# Patient Record
Sex: MALE | Race: WHITE
[De-identification: names, ages, dates, MRNs, and addresses within clinical notes are randomized per-mention and may not be internally consistent; named-entity substitution may affect disease eponyms.]

---

## 2018-01-29 ENCOUNTER — HOSPITAL ENCOUNTER (OUTPATIENT)
Dept: HOSPITAL 17 - PHED | Age: 83
Setting detail: OBSERVATION
LOS: 2 days | Discharge: HOME | End: 2018-01-31
Attending: HOSPITALIST | Admitting: HOSPITALIST
Payer: COMMERCIAL

## 2018-01-29 VITALS
OXYGEN SATURATION: 97 % | HEART RATE: 81 BPM | DIASTOLIC BLOOD PRESSURE: 84 MMHG | SYSTOLIC BLOOD PRESSURE: 161 MMHG | RESPIRATION RATE: 16 BRPM

## 2018-01-29 VITALS
HEART RATE: 68 BPM | RESPIRATION RATE: 12 BRPM | DIASTOLIC BLOOD PRESSURE: 76 MMHG | SYSTOLIC BLOOD PRESSURE: 148 MMHG | TEMPERATURE: 97.5 F | OXYGEN SATURATION: 91 %

## 2018-01-29 VITALS
HEART RATE: 70 BPM | RESPIRATION RATE: 11 BRPM | DIASTOLIC BLOOD PRESSURE: 76 MMHG | SYSTOLIC BLOOD PRESSURE: 156 MMHG | OXYGEN SATURATION: 98 %

## 2018-01-29 VITALS
SYSTOLIC BLOOD PRESSURE: 163 MMHG | DIASTOLIC BLOOD PRESSURE: 84 MMHG | RESPIRATION RATE: 14 BRPM | HEART RATE: 63 BPM | TEMPERATURE: 96 F

## 2018-01-29 VITALS
SYSTOLIC BLOOD PRESSURE: 151 MMHG | HEART RATE: 75 BPM | OXYGEN SATURATION: 96 % | DIASTOLIC BLOOD PRESSURE: 86 MMHG | RESPIRATION RATE: 14 BRPM

## 2018-01-29 VITALS
OXYGEN SATURATION: 96 % | DIASTOLIC BLOOD PRESSURE: 73 MMHG | HEART RATE: 63 BPM | SYSTOLIC BLOOD PRESSURE: 144 MMHG | RESPIRATION RATE: 14 BRPM

## 2018-01-29 VITALS
RESPIRATION RATE: 12 BRPM | TEMPERATURE: 97.6 F | DIASTOLIC BLOOD PRESSURE: 101 MMHG | SYSTOLIC BLOOD PRESSURE: 164 MMHG | OXYGEN SATURATION: 95 % | HEART RATE: 72 BPM

## 2018-01-29 VITALS — OXYGEN SATURATION: 97 %

## 2018-01-29 VITALS
RESPIRATION RATE: 16 BRPM | OXYGEN SATURATION: 98 % | HEART RATE: 78 BPM | DIASTOLIC BLOOD PRESSURE: 83 MMHG | SYSTOLIC BLOOD PRESSURE: 166 MMHG

## 2018-01-29 VITALS — OXYGEN SATURATION: 95 %

## 2018-01-29 VITALS — HEIGHT: 72 IN | WEIGHT: 211.2 LBS | BODY MASS INDEX: 28.61 KG/M2

## 2018-01-29 DIAGNOSIS — I48.91: ICD-10-CM

## 2018-01-29 DIAGNOSIS — E03.9: ICD-10-CM
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
## 2018-01-29 DIAGNOSIS — N40.0: ICD-10-CM

## 2018-01-29 DIAGNOSIS — I11.0: ICD-10-CM

## 2018-01-29 DIAGNOSIS — Z87.442: ICD-10-CM

## 2018-01-29 DIAGNOSIS — I50.9: ICD-10-CM

## 2018-01-29 DIAGNOSIS — E11.9: ICD-10-CM

## 2018-01-29 DIAGNOSIS — G47.10: ICD-10-CM

## 2018-01-29 DIAGNOSIS — K21.9: ICD-10-CM

## 2018-01-29 DIAGNOSIS — Z79.01: ICD-10-CM

## 2018-01-29 DIAGNOSIS — S42.201D: ICD-10-CM

## 2018-01-29 DIAGNOSIS — E78.5: ICD-10-CM

## 2018-01-29 DIAGNOSIS — Z79.84: ICD-10-CM

## 2018-01-29 DIAGNOSIS — G92: Primary | ICD-10-CM

## 2018-01-29 DIAGNOSIS — E78.00: ICD-10-CM

## 2018-01-29 DIAGNOSIS — T42.4X5A: ICD-10-CM

## 2018-01-29 LAB
BASOPHILS # BLD AUTO: 0 TH/MM3 (ref 0–0.2)
BASOPHILS NFR BLD: 0.6 % (ref 0–2)
BUN SERPL-MCNC: 21 MG/DL (ref 7–18)
CALCIUM SERPL-MCNC: 8.5 MG/DL (ref 8.5–10.1)
CHLORIDE SERPL-SCNC: 105 MEQ/L (ref 98–107)
COLOR UR: YELLOW
CREAT SERPL-MCNC: 1.2 MG/DL (ref 0.6–1.3)
EOSINOPHIL # BLD: 0.2 TH/MM3 (ref 0–0.4)
EOSINOPHIL NFR BLD: 3 % (ref 0–4)
ERYTHROCYTE [DISTWIDTH] IN BLOOD BY AUTOMATED COUNT: 13.5 % (ref 11.6–17.2)
GFR SERPLBLD BASED ON 1.73 SQ M-ARVRAT: 58 ML/MIN (ref 89–?)
GLUCOSE SERPL-MCNC: 161 MG/DL (ref 74–106)
GLUCOSE UR STRIP-MCNC: (no result) MG/DL
HCO3 BLD-SCNC: 29.2 MEQ/L (ref 21–32)
HCT VFR BLD CALC: 37.8 % (ref 39–51)
HGB BLD-MCNC: 12.6 GM/DL (ref 13–17)
HGB UR QL STRIP: (no result)
KETONES UR STRIP-MCNC: (no result) MG/DL
LEUKOCYTE ESTERASE UR QL STRIP: (no result) /HPF (ref 0–5)
LYMPHOCYTES # BLD AUTO: 0.9 TH/MM3 (ref 1–4.8)
LYMPHOCYTES NFR BLD AUTO: 14.1 % (ref 9–44)
MCH RBC QN AUTO: 30 PG (ref 27–34)
MCHC RBC AUTO-ENTMCNC: 33.3 % (ref 32–36)
MCV RBC AUTO: 90.2 FL (ref 80–100)
MONOCYTE #: 0.7 TH/MM3 (ref 0–0.9)
MONOCYTES NFR BLD: 10 % (ref 0–8)
NEUTROPHILS # BLD AUTO: 4.9 TH/MM3 (ref 1.8–7.7)
NEUTROPHILS NFR BLD AUTO: 72.3 % (ref 16–70)
NITRITE UR QL STRIP: (no result)
PLATELET # BLD: 171 TH/MM3 (ref 150–450)
PMV BLD AUTO: 8.3 FL (ref 7–11)
RBC # BLD AUTO: 4.19 MIL/MM3 (ref 4.5–5.9)
RBC #/AREA URNS HPF: (no result) /HPF (ref 0–3)
SODIUM SERPL-SCNC: 138 MEQ/L (ref 136–145)
SP GR UR STRIP: 1.03 (ref 1–1.03)
SQUAMOUS #/AREA URNS HPF: (no result) /HPF (ref 0–5)
URINE LEUKOCYTE ESTERASE: (no result)
WBC # BLD AUTO: 6.7 TH/MM3 (ref 4–11)

## 2018-01-29 PROCEDURE — G0378 HOSPITAL OBSERVATION PER HR: HCPCS

## 2018-01-29 PROCEDURE — 80307 DRUG TEST PRSMV CHEM ANLYZR: CPT

## 2018-01-29 PROCEDURE — 97166 OT EVAL MOD COMPLEX 45 MIN: CPT

## 2018-01-29 PROCEDURE — 81001 URINALYSIS AUTO W/SCOPE: CPT

## 2018-01-29 PROCEDURE — 80048 BASIC METABOLIC PNL TOTAL CA: CPT

## 2018-01-29 PROCEDURE — 82948 REAGENT STRIP/BLOOD GLUCOSE: CPT

## 2018-01-29 PROCEDURE — 71046 X-RAY EXAM CHEST 2 VIEWS: CPT

## 2018-01-29 PROCEDURE — 84443 ASSAY THYROID STIM HORMONE: CPT

## 2018-01-29 PROCEDURE — 99285 EMERGENCY DEPT VISIT HI MDM: CPT

## 2018-01-29 PROCEDURE — 96365 THER/PROPH/DIAG IV INF INIT: CPT

## 2018-01-29 PROCEDURE — 97162 PT EVAL MOD COMPLEX 30 MIN: CPT

## 2018-01-29 PROCEDURE — 85025 COMPLETE CBC W/AUTO DIFF WBC: CPT

## 2018-01-29 PROCEDURE — 96372 THER/PROPH/DIAG INJ SC/IM: CPT

## 2018-01-29 PROCEDURE — 96376 TX/PRO/DX INJ SAME DRUG ADON: CPT

## 2018-01-29 PROCEDURE — 82140 ASSAY OF AMMONIA: CPT

## 2018-01-29 PROCEDURE — 87641 MR-STAPH DNA AMP PROBE: CPT

## 2018-01-29 PROCEDURE — 96375 TX/PRO/DX INJ NEW DRUG ADDON: CPT

## 2018-01-29 RX ADMIN — TERAZOSIN HYDROCHLORIDE ANHYDROUS SCH MG: 1 CAPSULE ORAL at 22:36

## 2018-01-29 RX ADMIN — APIXABAN SCH MG: 2.5 TABLET, FILM COATED ORAL at 22:35

## 2018-01-29 RX ADMIN — METOPROLOL TARTRATE SCH MG: 25 TABLET, FILM COATED ORAL at 22:36

## 2018-01-29 RX ADMIN — ATORVASTATIN CALCIUM SCH MG: 20 TABLET, FILM COATED ORAL at 22:36

## 2018-01-29 RX ADMIN — ENOXAPARIN SODIUM SCH MG: 30 INJECTION SUBCUTANEOUS at 20:17

## 2018-01-29 NOTE — HHI.HP
__________________________________________________





[facility]


Service


[facility]


Primary Care Physician


MATT Enamorado Do, MD


Admission Diagnosis





AMS


Diagnoses:  


Chief Complaint:  


too drowsy


Travel History


International Travel<30 Days:  No


Contact w/Intl Traveler <30 Da:  No


Traveled to Known Affected Are:  No


History of Present Illness


82-year-old white male being admitted for hypersomnolence.





Patient is along the room, inadequate historian.  History is obtained from 

emergency room physician discussion and emergency room records.  Per ER 

physician, the patient was brought at the decision of his family when they 

thought he was to somnolent earlier today.  There was a suspicion that he might 

have overmedicated himself on some narcotic medication that he was prescribed 

for a humerus fracture that he recently had an managed at [facility].





In the emergency room he was given Narcan in the ER physician says that he did 

perk up partially.





Review of Systems


Except as stated in HPI:  all other systems reviewed are Neg





Past Family Social History


Past Medical History


afib, chf, htn, hypothyroidism


Allergies:  


Coded Allergies:  


     No Known Allergies (Verified  Allergy, Unknown, 18)


Family History


Unable to obtain


Social History


Unable to obtain





Physical Exam


Vital Signs





Vital Signs








  Date Time  Temp Pulse Resp B/P (MAP) Pulse Ox O2 Delivery O2 Flow Rate FiO2


 


18 18:37  75 14 151/86 (107) 96 Nasal Cannula 2.00 


 


18 17:53 96.0 63 14 163/84 (110)  Nasal Cannula 2.00 


 


18 16:45  63 14 144/73 (96) 96 Nasal Cannula  


 


18 16:34     95 Nasal Cannula 2.00 


 


18 15:47     94 Nasal Cannula 2.00 


 


18 15:40 97.5 68 12 148/76 (100) 91   








Physical Exam


VS: afebrile


GENERAL: Appears to be falling asleep with my conversation with him, dozing off

, snoring, does get awoken when spoken to


SKIN: Warm and dry.


EYES: Miosis is evident with still reactive pupils to light No scleral icterus. 

No injection or drainage. 


ENT: No nasal bleeding or discharge.


CARDIOVASCULAR: Regular rate and rhythm.  no murmurs


RESPIRATORY: No accessory muscle use. Clear to auscultation. Breath sounds 

equal bilaterally. 


GASTROINTESTINAL: Abdomen soft, non-tender, nondistended. 


Extremities: No clubbing, cyanosis, or edema.  Right arm in sling


MUSCULOSKELETAL: grossly intact ROM with 3/5 strength in upper and lower 

extremities proximally; adequate muscle bulk and tone for age and habitus


NEUROLOGICAL: Is oriented only to person, partially oriented to place by 

stating this is [facility] when it is [facility].  Is not oriented 

to time. 


PSYCHIATRIC: Unable to fully assess due to hypersomnolence


Laboratory





Laboratory Tests








Test


  18


16:40 18


17:00 18


19:15


 


White Blood Count 6.7   


 


Red Blood Count 4.19   


 


Hemoglobin 12.6   


 


Hematocrit 37.8   


 


Mean Corpuscular Volume 90.2   


 


Mean Corpuscular Hemoglobin 30.0   


 


Mean Corpuscular Hemoglobin


Concent 33.3 


  


  


 


 


Red Cell Distribution Width 13.5   


 


Platelet Count 171   


 


Mean Platelet Volume 8.3   


 


Neutrophils (%) (Auto) 72.3   


 


Lymphocytes (%) (Auto) 14.1   


 


Monocytes (%) (Auto) 10.0   


 


Eosinophils (%) (Auto) 3.0   


 


Basophils (%) (Auto) 0.6   


 


Neutrophils # (Auto) 4.9   


 


Lymphocytes # (Auto) 0.9   


 


Monocytes # (Auto) 0.7   


 


Eosinophils # (Auto) 0.2   


 


Basophils # (Auto) 0.0   


 


CBC Comment DIFF FINAL   


 


Differential Comment    


 


Blood Urea Nitrogen 21   


 


Creatinine 1.20   


 


Random Glucose 161   


 


Calcium Level 8.5   


 


Sodium Level 138   


 


Potassium Level 4.3   


 


Chloride Level 105   


 


Carbon Dioxide Level 29.2   


 


Anion Gap 4   


 


Estimat Glomerular Filtration


Rate 58 


  


  


 


 


Urine Color  YELLOW  


 


Urine Turbidity  CLEAR  


 


Urine pH  5.5  


 


Urine Specific Gravity  1.026  


 


Urine Protein  NEG  


 


Urine Glucose (UA)  NEG  


 


Urine Ketones  NEG  


 


Urine Occult Blood  NEG  


 


Urine Nitrite  NEG  


 


Urine Bilirubin  NEG  


 


Urine Leukocyte Esterase  NEG  


 


Urine RBC  0-3  


 


Urine WBC  0-2  


 


Urine Squamous Epithelial


Cells 


  0-5 


  


 


 


Microscopic Urinalysis Comment


  


  CATH-CULT NOT


IND 


 


 


Ammonia   19 








Result Diagram:  


18 1640                                                                   

             18 1640





Imaging





Last Impressions








Chest X-Ray 18 [id number] Signed





Impressions: 





 Service Date/Time:  2018 17:19 - CONCLUSION: No acute 





 disease.       Michael R. Schiering, MD Caprini VTE Risk Assessment


Caprini VTE Risk Assessment:  Mod/High Risk (score >= 2)


Caprini Risk Assessment Model











 Point Value = 1          Point Value = 2  Point Value = 3  Point Value = 5


 


Age 41-60


Minor surgery


BMI > 25 kg/m2


Swollen legs


Varicose veins


Pregnancy or postpartum


History of unexplained or recurrent


   spontaneous 


Oral contraceptives or hormone


   replacement


Sepsis (< 1 month)


Serious lung disease, including


   pneumonia (< 1 month)


Abnormal pulmonary function


Acute myocardial infarction


Congestive heart failure (< 1 month)


History of inflammatory bowel disease


Medical patient at bed rest Age 61-74


Arthroscopic surgery


Major open surgery (> 45 min)


Laparoscopic surgery (> 45 min)


Malignancy


Confined to bed (> 72 hours)


Immobilizing plaster cast


Central venous access Age >= 75


History of VTE


Family history of VTE


Factor V Leiden


Prothrombin 21546B


Lupus anticoagulant


Anticardiolipin antibodies


Elevated serum homocysteine


Heparin-induced thrombocytopenia


Other congenital or acquired


   thrombophilia Stroke (< 1 month)


Elective arthroplasty


Hip, pelvis, or leg fracture


Acute spinal cord injury (< 1 month)








Prophylaxis Regimen











   Total Risk


Factor Score Risk Level Prophylaxis Regimen


 


0-1      Low Early ambulation


 


2 Moderate Order ONE of the following:


*Sequential Compression Device (SCD)


*Heparin 5000 units SQ BID


 


3-4 Higher Order ONE of the following medications:


*Heparin 5000 units SQ TID


*Enoxaparin/Lovenox 40 mg SQ daily (WT < 150 kg, CrCl > 30 mL/min)


*Enoxaparin/Lovenox 30 mg SQ daily (WT < 150 kg, CrCl > 10-29 mL/min)


*Enoxaparin/Lovenox 30 mg SQ BID (WT < 150 kg, CrCl > 30 mL/min)


AND/OR


*Sequential Compression Device (SCD)


 


5 or more Highest Order ONE of the following medications:


*Heparin 5000 units SQ TID (Preferred with Epidurals)


*Enoxaparin/Lovenox 40 mg SQ daily (WT < 150 kg, CrCl > 30 mL/min)


*Enoxaparin/Lovenox 30 mg SQ daily (WT < 150 kg, CrCl > 10-29 mL/min)


*Enoxaparin/Lovenox 30 mg SQ BID (WT < 150 kg, CrCl > 30 mL/min)


AND


*Sequential Compression Device (SCD)











Assessment and Plan


Assessment and Plan


82-year-old white male being admitted for encephalopathy secondary to 

hypersomnolence secondary to possible opiate overdose. 





Toxic encephalopathy


- Suspect opiate overdose; with partial improvement with Narcan we will give 

another dose.  I independently reviewed the chest x-ray and see no acute 

infiltrates.


- No opiate medicines through the night despite the patient's pain from his arm


- Fall precautions, neuro checks every 4 hours, bedside nurse and swallow 

assessment


- Obtain urine drug screen, ammonia level, serum alcohol level


- Admitting to ICU





CHF/Hypertension/hyperlipidemia/hypothyroidism/BPH


- Resume home medications





Physician Certification


2 Midnight Certification Type:  Admission for Inpatient Services


Order for Inpatient Services


The services are ordered in accordance with Medicare regulations or non-

Medicare payer requirements, as applicable.  In the case of services not 

specified as inpatient-only, they are appropriately provided as inpatient 

services in accordance with the 2-midnight benchmark.


Estimated LOS (days):  2


2 days is the estimated time the patient will need to remain in the hospital, 

assuming treatment plan goals are met and no additional complications.


Post-Hospital Plan:  Home











Jerardo Wynn MD 2018 19:52

## 2018-01-29 NOTE — RADRPT
EXAM DATE/TIME:  01/29/2018 17:19 

 

[facility] COMPARISON:     

CHEST PA & LAT, May 31, 2016, 12:57.

 

                     

INDICATIONS :     

Shortness of breath after possible opioid overdose.

                     

 

MEDICAL HISTORY :     

Hypertension.  Congestive heart failure.  Aneurysm, abdominal.      

 

SURGICAL HISTORY :     

None.   

 

ENCOUNTER:     

Initial                                        

 

ACUITY:     

1 day      

 

PAIN SCORE:     

Non-responsive.

 

LOCATION:     

Bilateral chest 

 

FINDINGS:     

PA and lateral views of the chest demonstrate the lungs to be symmetrically aerated without evidence 
of mass, infiltrate or effusion. The lateral views are suboptimal secondary to the patient's arms deejay
ng projected over the chest. The cardiomediastinal contours are unremarkable. There are multiple calc
ified left hilar lymph nodes. There is overlying artifact. Osseous structures are intact.

 

CONCLUSION:     No acute disease.  

 

 

 

 Demetrius Oh MD on January 29, 2018 at 17:51           

Board Certified Radiologist.

 This report was verified electronically.

## 2018-01-29 NOTE — PD
HPI


.


AMS


Chief Complaint:  OD/ Ingestion


Time Seen by Provider:  15:44


Travel History


International Travel<30 days:  No


Contact w/Intl Traveler<30days:  No


Traveled to known affect area:  No





History of Present Illness


HPI


This is an 82 year old male who presents after an accidental overdose of an 

opioid. Because of this, it was difficult for the patient to respond to 

questions as he entered in and out of sleep frequently. The patient was able to 

report that he took the medication in the afternoon and this medication was 

prescribed from a [facility] after falling on his arm. He also was 

able to report that he had taken his morning dose of Eliquis 5 mg.





PFSH


Past Medical History


Hx Anticoagulant Therapy:  Yes (COUMADIN)


Arthritis:  Yes


Autoimmune Disease:  No


Blood Disorders:  No


Anxiety:  Yes


Depression:  No


Heart Rhythm Problems:  Yes (HX IF EPISODES OF AFIB)


Cancer:  No


Cardiovascular Problems:  Yes (CONGESTIVE HEART FAILURE 2007, AAA)


High Cholesterol:  Yes


Chest Pain:  No


Congestive Heart Failure:  Yes


Diabetes:  Yes (TYPE II)


Diminished Hearing:  No


Endocrine:  Yes


Gastrointestinal Disorders:  Yes


GERD:  Yes


Genitourinary:  No


Hepatitis:  No


Hiatal Hernia:  No


Hypertension:  Yes


Immune Disorder:  No


Implanted Vascular Access Dvce:  No


Kidney Stones:  Yes


Musculoskeletal:  Yes (DEGENERATIVE OSTEOARTHRITIS)


Neurologic:  No


Psychiatric:  No


Reproductive:  No


Respiratory:  No


Immunizations Current:  Yes


Myocardial Infarction:  No


Renal Failure:  No


Thyroid Disease:  Yes (HYPOTHYROID)


Ulcer:  No





Past Surgical History


Abdominal Surgery:  Yes (CHOLECYSTECTOMY 10/30/2014)


Appendectomy:  No


Cardiac Surgery:  No


Cholecystectomy:  No


Neurologic Surgery:  No


Oral Surgery:  Yes (TONSILLECTOMY)


Thoracic Surgery:  No


Tonsillectomy:  Yes


Other Surgery:  Yes





Social History


Alcohol Use:  Yes (OCCASIONAL)


Tobacco Use:  No


Substance Use:  No





Allergies-Medications


(Allergen,Severity, Reaction):  


Coded Allergies:  


     No Known Allergies (Verified  Adverse Reaction, Unknown, 1/29/18)


Reported Meds & Prescriptions





Reported Meds & Active Scripts


Active


Reported


Hydrocodone-Acetaminophen 7.5 Mg-325 Mg Tab 1 Tab PO Q4H PRN


Trazodone (Trazodone HCl) 100 Mg Tablet 100 Mg PO HS


Terazosin (Terazosin HCl) 2 Mg Cap 2 Mg PO HS


Metoprolol Tartrate 25 Mg Tab 25 Mg PO BID


Lisinopril 2.5 Mg Tab 2.5 Mg PO DAILY


Levothyroxine (Levothyroxine Sodium) 175 Mcg Tab 175 Mcg PO DAILY


Glipizide XL (Glipizide) 5 Mg Harrison 5 Mg PO DAILY


     Take with breakfast or first main meal of the day


Proscar (Finasteride) 5 Mg Tab 5 Mg PO DAILY


     Do not crush.


Eliquis (Apixaban) 2.5 Mg Tab 2.5 Mg PO BID


Vitamin B-12 (Cyanocobalamin) 1,000 Mcg Tab 1,000 Mcg PO DAILY


D-2000 Maximum Strength (Cholecalciferol) 2,000 Unit Tab 2,000 Units PO DAILY


Lipitor (Atorvastatin Calcium) 20 Mg Tab 20 Mg PO HS








Review of Systems


ROS Limitations:  Altered Mental Status





Physical Exam


Exam Limitations:  Altered Mental Status


Narrative


GENERAL: Patient is sleepy but arousable.


SKIN:  warm/dry.


HEAD: Normocephalic.  Atraumatic.


EYES: Pupils equal and round. No scleral icterus. No injection or drainage. 


ENT: No nasal bleeding or discharge.  Mucous membranes pink and moist.


NECK: Trachea midline.  Full range of motion without pain.. 


CARDIOVASCULAR: Regular rate and rhythm.  


RESPIRATORY: No accessory muscle use. Clear to auscultation. Breath sounds 

equal bilaterally. 


GASTROINTESTINAL: Abdomen soft.  Nontender.  Bowel sounds present.  

Nondistended.


MUSCULOSKELETAL: Right upper extremity is in a sling.


NEUROLOGICAL: Sleepy but arousable.  No obvious neurological deficits.


PSYCHIATRIC: Unable to evaluate.





Data


Data


Last Documented VS





Vital Signs








  Date Time  Temp Pulse Resp B/P (MAP) Pulse Ox O2 Delivery O2 Flow Rate FiO2


 


1/29/18 17:53 96.0 63 14 163/84 (110)  Nasal Cannula 2.00 


 


1/29/18 16:45     96   








Orders





 Orders


Basic Metabolic Panel (Bmp) (1/29/18 16:21)


Complete Blood Count With Diff (1/29/18 16:21)


Urinalysis - C+S If Indicated (1/29/18 16:21)


Blood Glucose (1/29/18 16:21)


Ecg Monitoring (1/29/18 16:21)


Iv Access Insert/Monitor (1/29/18 16:21)


Cath For Specimen (1/29/18 16:21)


Oximetry (1/29/18 16:21)


Naloxone Inj (Narcan Inj) (1/29/18 16:30)


Sodium Chloride 0.9% Flush (Ns Flush) (1/29/18 16:30)


Chest, Pa & Lat (1/29/18 17:15)


Admit Order (Ed Use Only) (1/29/18 )


Vital Signs (Adult) Q4H (1/29/18 18:16)


Diet Heart Healthy (1/29/18 Dinner)


Activity Oob With Assistance (1/29/18 18:16)


Notify Dr: Other (1/29/18 18:16)





Labs





Laboratory Tests








Test


  1/29/18


16:40 1/29/18


17:00


 


White Blood Count 6.7 TH/MM3  


 


Red Blood Count 4.19 MIL/MM3  


 


Hemoglobin 12.6 GM/DL  


 


Hematocrit 37.8 %  


 


Mean Corpuscular Volume 90.2 FL  


 


Mean Corpuscular Hemoglobin 30.0 PG  


 


Mean Corpuscular Hemoglobin


Concent 33.3 % 


  


 


 


Red Cell Distribution Width 13.5 %  


 


Platelet Count 171 TH/MM3  


 


Mean Platelet Volume 8.3 FL  


 


Neutrophils (%) (Auto) 72.3 %  


 


Lymphocytes (%) (Auto) 14.1 %  


 


Monocytes (%) (Auto) 10.0 %  


 


Eosinophils (%) (Auto) 3.0 %  


 


Basophils (%) (Auto) 0.6 %  


 


Neutrophils # (Auto) 4.9 TH/MM3  


 


Lymphocytes # (Auto) 0.9 TH/MM3  


 


Monocytes # (Auto) 0.7 TH/MM3  


 


Eosinophils # (Auto) 0.2 TH/MM3  


 


Basophils # (Auto) 0.0 TH/MM3  


 


CBC Comment DIFF FINAL  


 


Differential Comment   


 


Blood Urea Nitrogen 21 MG/DL  


 


Creatinine 1.20 MG/DL  


 


Random Glucose 161 MG/DL  


 


Calcium Level 8.5 MG/DL  


 


Sodium Level 138 MEQ/L  


 


Potassium Level 4.3 MEQ/L  


 


Chloride Level 105 MEQ/L  


 


Carbon Dioxide Level 29.2 MEQ/L  


 


Anion Gap 4 MEQ/L  


 


Estimat Glomerular Filtration


Rate 58 ML/MIN 


  


 


 


Urine Color  YELLOW 


 


Urine Turbidity  CLEAR 


 


Urine pH  5.5 


 


Urine Specific Gravity  1.026 


 


Urine Protein  NEG mg/dL 


 


Urine Glucose (UA)  NEG mg/dL 


 


Urine Ketones  NEG mg/dL 


 


Urine Occult Blood  NEG 


 


Urine Nitrite  NEG 


 


Urine Bilirubin  NEG 


 


Urine Leukocyte Esterase  NEG 


 


Urine RBC  0-3 /hpf 


 


Urine WBC  0-2 /hpf 


 


Urine Squamous Epithelial


Cells 


  0-5 /hpf 


 


 


Microscopic Urinalysis Comment


  


  CATH-CULT NOT


IND











MDM


Medical Decision Making


Medical Screen Exam Complete:  Yes


Emergency Medical Condition:  Yes


Differential Diagnosis


Differential diagnosis of altered mental status includes but is not limited to 

infection, electrolyte abnormality, neurological event, intoxication


Narrative Course


This patient presents with altered mental status in the setting of the use of 

opiates following a shoulder fracture.  His family is concerned because he has 

only taken 10 Percocet since they were prescribed 3 days ago.  They are 

concerned that he has an altered mental status for another reason.  We will 

give him Narcan to see if that improves his mental status.  I have also ordered 

a CBC, basic metabolic panel and urinalysis.





Patient was given Narcan with improvement in his mental status.  He is now 

complaining bitterly with right shoulder pain.  I have treated his pain with an 

ice pack





CBC & BMP Diagram


1/29/18 16:40








Calcium Level 8.5





UA neg








Last Impressions








Chest X-Ray 1/29/18 9805 Signed





Impressions: 





 Service Date/Time:  Monday, January 29, 2018 17:19 - CONCLUSION: No acute 





 disease.       Demetrius Oh MD 








This patient's mental status is improved following the Narcan that he is still 

sleepy.  Family is very reluctant to take him home because of altered level of 

consciousness the significant right shoulder pain.  Dr. Zhao is agreeable to 

placing him in observation for the night.





Diagnosis





 Primary Impression:  


 Altered mental status


 Qualified Codes:  R40.4 - Transient alteration of awareness


 Additional Impression:  


 Closed fracture of right proximal humerus


 Qualified Codes:  S42.294D - Other nondisplaced fracture of upper end of right 

humerus, subsequent encounter for fracture with routine healing





Admitting Information


Admitting Physician Requests:  Observation


Condition:  Stable











Ellyn Garcia MD Jan 29, 2018 15:54

## 2018-01-30 VITALS
HEART RATE: 74 BPM | SYSTOLIC BLOOD PRESSURE: 145 MMHG | RESPIRATION RATE: 14 BRPM | OXYGEN SATURATION: 97 % | DIASTOLIC BLOOD PRESSURE: 79 MMHG

## 2018-01-30 VITALS
OXYGEN SATURATION: 96 % | DIASTOLIC BLOOD PRESSURE: 71 MMHG | RESPIRATION RATE: 14 BRPM | SYSTOLIC BLOOD PRESSURE: 140 MMHG | HEART RATE: 70 BPM | TEMPERATURE: 98.5 F

## 2018-01-30 VITALS
RESPIRATION RATE: 12 BRPM | SYSTOLIC BLOOD PRESSURE: 144 MMHG | DIASTOLIC BLOOD PRESSURE: 78 MMHG | OXYGEN SATURATION: 98 % | HEART RATE: 68 BPM

## 2018-01-30 VITALS
RESPIRATION RATE: 11 BRPM | DIASTOLIC BLOOD PRESSURE: 75 MMHG | OXYGEN SATURATION: 97 % | SYSTOLIC BLOOD PRESSURE: 150 MMHG | HEART RATE: 66 BPM

## 2018-01-30 VITALS
DIASTOLIC BLOOD PRESSURE: 70 MMHG | SYSTOLIC BLOOD PRESSURE: 147 MMHG | RESPIRATION RATE: 11 BRPM | HEART RATE: 74 BPM | OXYGEN SATURATION: 97 %

## 2018-01-30 VITALS
RESPIRATION RATE: 19 BRPM | HEART RATE: 76 BPM | SYSTOLIC BLOOD PRESSURE: 117 MMHG | OXYGEN SATURATION: 93 % | DIASTOLIC BLOOD PRESSURE: 67 MMHG

## 2018-01-30 VITALS
RESPIRATION RATE: 13 BRPM | SYSTOLIC BLOOD PRESSURE: 143 MMHG | DIASTOLIC BLOOD PRESSURE: 83 MMHG | OXYGEN SATURATION: 97 % | HEART RATE: 76 BPM

## 2018-01-30 VITALS
TEMPERATURE: 98.2 F | SYSTOLIC BLOOD PRESSURE: 139 MMHG | RESPIRATION RATE: 11 BRPM | DIASTOLIC BLOOD PRESSURE: 74 MMHG | HEART RATE: 64 BPM | OXYGEN SATURATION: 97 %

## 2018-01-30 VITALS
DIASTOLIC BLOOD PRESSURE: 80 MMHG | SYSTOLIC BLOOD PRESSURE: 145 MMHG | RESPIRATION RATE: 31 BRPM | OXYGEN SATURATION: 93 % | HEART RATE: 76 BPM

## 2018-01-30 VITALS
DIASTOLIC BLOOD PRESSURE: 75 MMHG | RESPIRATION RATE: 13 BRPM | OXYGEN SATURATION: 97 % | SYSTOLIC BLOOD PRESSURE: 137 MMHG | HEART RATE: 68 BPM

## 2018-01-30 VITALS — TEMPERATURE: 98.3 F | DIASTOLIC BLOOD PRESSURE: 60 MMHG | OXYGEN SATURATION: 93 % | SYSTOLIC BLOOD PRESSURE: 114 MMHG

## 2018-01-30 VITALS
SYSTOLIC BLOOD PRESSURE: 131 MMHG | RESPIRATION RATE: 13 BRPM | OXYGEN SATURATION: 97 % | DIASTOLIC BLOOD PRESSURE: 72 MMHG | HEART RATE: 80 BPM

## 2018-01-30 VITALS
DIASTOLIC BLOOD PRESSURE: 68 MMHG | HEART RATE: 68 BPM | OXYGEN SATURATION: 96 % | RESPIRATION RATE: 15 BRPM | SYSTOLIC BLOOD PRESSURE: 131 MMHG

## 2018-01-30 VITALS — TEMPERATURE: 98.3 F

## 2018-01-30 VITALS
DIASTOLIC BLOOD PRESSURE: 83 MMHG | SYSTOLIC BLOOD PRESSURE: 138 MMHG | RESPIRATION RATE: 12 BRPM | OXYGEN SATURATION: 97 % | HEART RATE: 76 BPM

## 2018-01-30 VITALS — HEART RATE: 70 BPM

## 2018-01-30 VITALS
DIASTOLIC BLOOD PRESSURE: 84 MMHG | OXYGEN SATURATION: 92 % | TEMPERATURE: 97.9 F | RESPIRATION RATE: 28 BRPM | HEART RATE: 82 BPM | SYSTOLIC BLOOD PRESSURE: 160 MMHG

## 2018-01-30 RX ADMIN — FINASTERIDE SCH MG: 5 TABLET, FILM COATED ORAL at 10:47

## 2018-01-30 RX ADMIN — ACETAMINOPHEN AND CODEINE PHOSPHATE PRN TAB: 300; 30 TABLET ORAL at 18:09

## 2018-01-30 RX ADMIN — APIXABAN SCH MG: 2.5 TABLET, FILM COATED ORAL at 10:47

## 2018-01-30 RX ADMIN — VITAMIN D, TAB 1000IU (100/BT) SCH UNITS: 25 TAB at 10:47

## 2018-01-30 RX ADMIN — ACETAMINOPHEN AND CODEINE PHOSPHATE PRN TAB: 300; 30 TABLET ORAL at 23:38

## 2018-01-30 RX ADMIN — ENOXAPARIN SODIUM SCH MG: 30 INJECTION SUBCUTANEOUS at 20:40

## 2018-01-30 RX ADMIN — TERAZOSIN HYDROCHLORIDE ANHYDROUS SCH MG: 1 CAPSULE ORAL at 20:40

## 2018-01-30 RX ADMIN — LISINOPRIL SCH MG: 5 TABLET ORAL at 10:48

## 2018-01-30 RX ADMIN — CYANOCOBALAMIN TAB 1000 MCG SCH MCG: 1000 TAB at 10:47

## 2018-01-30 RX ADMIN — ATORVASTATIN CALCIUM SCH MG: 20 TABLET, FILM COATED ORAL at 20:40

## 2018-01-30 RX ADMIN — APIXABAN SCH MG: 2.5 TABLET, FILM COATED ORAL at 20:40

## 2018-01-30 RX ADMIN — METOPROLOL TARTRATE SCH MG: 25 TABLET, FILM COATED ORAL at 10:47

## 2018-01-30 RX ADMIN — LEVOTHYROXINE SODIUM SCH MCG: 150 TABLET ORAL at 05:40

## 2018-01-30 RX ADMIN — METOPROLOL TARTRATE SCH MG: 25 TABLET, FILM COATED ORAL at 20:40

## 2018-01-30 RX ADMIN — LEVOTHYROXINE SODIUM SCH MCG: 25 TABLET ORAL at 05:40

## 2018-01-30 NOTE — HHI.FF
Face to Face Verification


Diagnosis:  


(1) Altered mental status


(2) Closed fracture of right proximal humerus


Physical Therapy


Order:  Evaluate and Treat, Improve ambulation





Home Health Nursing








Order: Medical education





 Signs/symptoms of disease process





 Medication education-adverse effect

















I have seen patient Keith MCBRIDE Jr [place] on 1/30/18. My clinical findings 

support the need for the requested home health care services because:








 Patient has SOB














I certify that my clinical findings support that this patient is homebound 

because:








 Unsteady gait/balance

















Liudmila Zuniga MD Jan 30, 2018 14:29

## 2018-01-30 NOTE — HHI.PR
Subjective


Remarks


patient seen in room


feels much better


Discussed with patient and spouse and ICU RN





Objective


Vitals





Vital Signs








  Date Time  Temp Pulse Resp B/P (MAP) Pulse Ox O2 Delivery O2 Flow Rate FiO2


 


1/30/18 14:09  76 19 117/67 (84) 93   


 


1/30/18 14:00  70      


 


1/30/18 12:31  76 31 145/80 (101) 93   


 


1/30/18 12:00  70      


 


1/30/18 11:00 97.9       


 


1/30/18 11:00  82 28 160/84 (109) 92   


 


1/30/18 10:00  80      


 


1/30/18 10:00  80 13 131/72 (91) 97   


 


1/30/18 09:00  74 11 147/70 (95) 97   


 


1/30/18 08:00  76      


 


1/30/18 08:00     97 Nasal Cannula 2.00 


 


1/30/18 08:00  76 13 143/83 (103) 97   


 


1/30/18 07:30 98.3       


 


1/30/18 07:00  68 13 137/75 (95) 97   


 


1/30/18 06:00  74 14 145/79 (101) 97   


 


1/30/18 06:00  74      


 


1/30/18 05:00  76 12 138/83 (101) 97   


 


1/30/18 04:00  70      


 


1/30/18 04:00 98.5 70 14 140/71 (94) 96   


 


1/30/18 03:00  68 12 144/78 (100) 98   


 


1/30/18 02:00  66 11 150/75 (100) 97   


 


1/30/18 02:00  66      


 


1/30/18 01:00  68 15 131/68 (89) 96   


 


1/30/18 00:00  64      


 


1/30/18 00:00 98.2 64 11 139/74 (95) 97   


 


1/29/18 23:25     97 Nasal Cannula 2.00 


 


1/29/18 23:00  70 11 156/76 (102) 98   


 


1/29/18 22:18  72      


 


1/29/18 22:18 97.6 72 12 164/101 (122) 95   


 


1/29/18 22:14  84 18  98 Nasal Cannula 2.00 


 


1/29/18 21:51  81 16 161/84 (109) 97   


 


1/29/18 20:38  78 16 166/83 (110) 98 Nasal Cannula 2.00 


 


1/29/18 18:37  75 14 151/86 (107) 96 Nasal Cannula 2.00 


 


1/29/18 17:53 96.0 63 14 163/84 (110)  Nasal Cannula 2.00 


 


1/29/18 16:45  63 14 144/73 (96) 96 Nasal Cannula  


 


1/29/18 16:34     95 Nasal Cannula 2.00 


 


1/29/18 15:47     94 Nasal Cannula 2.00 


 


1/29/18 15:40 97.5 68 12 148/76 (100) 91   














I/O      


 


 1/29/18 1/29/18 1/29/18 1/30/18 1/30/18 1/30/18





 07:00 15:00 23:00 07:00 15:00 23:00


 


Intake Total   100 ml  240 ml 


 


Output Total   500 ml 325 ml 150 ml 


 


Balance   -400 ml -325 ml 90 ml 


 


      


 


Intake Oral     240 ml 


 


IV Total   100 ml   


 


Output Urine Total   500 ml 325 ml 150 ml 








Result Diagram:  


1/29/18 1640                                                                   

             1/29/18 1640





Imaging





Last Impressions








Chest X-Ray 1/29/18 1715 Signed





Impressions: 





 Service Date/Time:  Monday, January 29, 2018 17:19 - CONCLUSION: No acute 





 disease.       Demetrius Oh MD 








Objective Remarks


GENERAL: This is a well-nourished, well-developed patient, in no apparent 

distress.


CARDIOVASCULAR: Regular rate and rhythm without murmurs, gallops, or rubs. 


RESPIRATORY: Clear to auscultation. Breath sounds equal bilaterally. No wheezes

, rales, or rhonchi.  


GASTROINTESTINAL: Abdomen soft, non-tender, nondistended. Normal active bowel 

sounds


MUSCULOSKELETAL: right arm sling, Extremities without clubbing, cyanosis, or 

edema.


NEURO:  Alert & Oriented x4 to person, place, time, situation.  Moves all ext x4





A/P


Problem List:  


(1) Toxic encephalopathy


ICD Code:  G92 - Toxic encephalopathy


Plan:  secondary due to XAnax and Narcotics


Better


ETOH frequently though not in 3 days


patient education provided


watch for withdrawal





(2) DM2 (diabetes mellitus, type 2)


ICD Code:  E11.9 - Type 2 diabetes mellitus without complications


Plan:  Resume home meds


ADA diet


Accuchecks





(3) Closed fracture of right proximal humerus


ICD Code:  S42.201A - Unspecified fracture of upper end of right humerus, 

initial encounter for closed fracture


Status:  Acute


Plan:  follow up with ortho outpatient


Sling, Tylenol #3








Problem Qualifiers





(1) Closed fracture of right proximal humerus:  


Qualified Codes:  S42.294D - Other nondisplaced fracture of upper end of right 

humerus, subsequent encounter for fracture with routine healing








Liudmila Zuniga MD Jan 30, 2018 14:36

## 2018-01-31 VITALS
RESPIRATION RATE: 16 BRPM | HEART RATE: 72 BPM | SYSTOLIC BLOOD PRESSURE: 124 MMHG | OXYGEN SATURATION: 92 % | TEMPERATURE: 98.9 F | DIASTOLIC BLOOD PRESSURE: 73 MMHG

## 2018-01-31 VITALS
RESPIRATION RATE: 14 BRPM | SYSTOLIC BLOOD PRESSURE: 116 MMHG | TEMPERATURE: 98.7 F | HEART RATE: 68 BPM | DIASTOLIC BLOOD PRESSURE: 72 MMHG

## 2018-01-31 VITALS — RESPIRATION RATE: 18 BRPM

## 2018-01-31 RX ADMIN — ACETAMINOPHEN AND CODEINE PHOSPHATE PRN TAB: 300; 30 TABLET ORAL at 05:11

## 2018-01-31 RX ADMIN — CYANOCOBALAMIN TAB 1000 MCG SCH MCG: 1000 TAB at 08:48

## 2018-01-31 RX ADMIN — VITAMIN D, TAB 1000IU (100/BT) SCH UNITS: 25 TAB at 08:48

## 2018-01-31 RX ADMIN — LEVOTHYROXINE SODIUM SCH MCG: 150 TABLET ORAL at 05:10

## 2018-01-31 RX ADMIN — FINASTERIDE SCH MG: 5 TABLET, FILM COATED ORAL at 08:48

## 2018-01-31 RX ADMIN — APIXABAN SCH MG: 2.5 TABLET, FILM COATED ORAL at 08:48

## 2018-01-31 RX ADMIN — LEVOTHYROXINE SODIUM SCH MCG: 25 TABLET ORAL at 05:10

## 2018-01-31 RX ADMIN — LISINOPRIL SCH MG: 5 TABLET ORAL at 08:48

## 2018-01-31 RX ADMIN — METOPROLOL TARTRATE SCH MG: 25 TABLET, FILM COATED ORAL at 08:48

## 2018-01-31 NOTE — HHI.DS
__________________________________________________





Discharge Summary


Admission Date


Jan 29, 2018 at 18:17


Discharge Date:  Jan 31, 2018


Admitting Diagnosis





AMS





(1) Toxic encephalopathy


ICD Code:  G92 - Toxic encephalopathy


(2) DM2 (diabetes mellitus, type 2)


ICD Code:  E11.9 - Type 2 diabetes mellitus without complications


(3) Closed fracture of right proximal humerus


ICD Code:  S42.201A - Unspecified fracture of upper end of right humerus, 

initial encounter for closed fracture


Status:  Acute


Procedures


none


Brief History - From Admission


82-year-old white male being admitted for hypersomnolence.





Patient is along the room, inadequate historian.  History is obtained from 

emergency room physician discussion and emergency room records.  Per ER 

physician, the patient was brought at the decision of his family when they 

thought he was to somnolent earlier today.  There was a suspicion that he might 

have overmedicated himself on some narcotic medication that he was prescribed 

for a humerus fracture that he recently had an managed at [facility].





In the emergency room he was given Narcan in the ER physician says that he did 

perk up partially.


CBC/BMP:  


1/29/18 1640                                                                   

             1/29/18 1640





Significant Findings





Laboratory Tests








Test


  1/29/18


16:40 1/29/18


17:00 1/29/18


19:15 1/29/18


21:50


 


Red Blood Count


  4.19 MIL/MM3


(4.50-5.90) 


  


  


 


 


Hemoglobin


  12.6 GM/DL


(13.0-17.0) 


  


  


 


 


Hematocrit


  37.8 %


(39.0-51.0) 


  


  


 


 


Neutrophils (%) (Auto)


  72.3 %


(16.0-70.0) 


  


  


 


 


Monocytes (%) (Auto)


  10.0 %


(0.0-8.0) 


  


  


 


 


Lymphocytes # (Auto)


  0.9 TH/MM3


(1.0-4.8) 


  


  


 


 


Blood Urea Nitrogen


  21 MG/DL


(7-18) 


  


  


 


 


Random Glucose


  161 MG/DL


() 


  


  


 


 


Anion Gap 4 MEQ/L (5-15)    


 


Estimat Glomerular Filtration


Rate 58 ML/MIN


(>89) 


  


  


 


 


Urine Opiates Screen    POS (NEG) 


 


Urine Benzodiazepines Screen    POS (NEG) 


 


Test


  1/29/18


22:29 


  


  


 








Imaging





Last Impressions








Chest X-Ray 1/29/18 3833 Signed





Impressions: 





 Service Date/Time:  Monday, January 29, 2018 17:19 - CONCLUSION: No acute 





 disease.       Demetrius Oh MD 








PE at Discharge


GENERAL: This is a well-nourished, well-developed patient, in no apparent 

distress.


CARDIOVASCULAR: Regular rate and rhythm without murmurs, gallops, or rubs. 


RESPIRATORY: Clear to auscultation. Breath sounds equal bilaterally. No wheezes

, rales, or rhonchi.  


GASTROINTESTINAL: Abdomen soft, non-tender, nondistended. Normal active bowel 

sounds


MUSCULOSKELETAL: right arm sling, Extremities without clubbing, cyanosis, or 

edema.


NEURO:  Alert & Oriented x4 to person, place, time, situation.  Moves all ext x4


Pt update on day of discharge


Patient seen today without new complaints


Back to baseline mentation, patient is ambulatory without difficulty


Hospital Course


Patient's 82-year-old gentleman who appears to have some toxic encephalopathy 

related to ingestion of Xanax with narcotics and alcohol.  He has done well 

overnight.  There is no sign of alcohol withdrawal.  Patient's pain is 

controlled on Tylenol with Codeine alone.  Education was provided at the 

bedside and discharge plans have been discussed with him.


Pt Condition on Discharge:  Good


Discharge Disposition:  Discharge Home


Discharge Time:  <= 30 minutes


Discharge Instructions


DIET: Follow Instructions for:  As Tolerated, No Restrictions


Activities you can perform:  Regular-No Restrictions


Follow up Referrals:  


Orthopedics @ [facility]





New Medications:  


Acetaminophen-Codeine (Tylenol-Codeine #3) 300-30 mg Tab


1 TAB PO Q4H PRN for PAIN, #10 TAB 0 Refills





 


Continued Medications:  


Apixaban (Eliquis) 2.5 Mg Tab


2.5 MG PO BID for Blood Clot Prevention, TAB 0 Refills





Atorvastatin (Lipitor) 20 Mg Tab


20 MG PO HS for Cholesterol Management, #30 TAB 0 Refills





Cholecalciferol (D-2000 Maximum Strength) 2,000 Unit Tab


2000 UNITS PO DAILY for Nutritional Supplement, #30 TAB 0 Refills





Cyanocobalamin (Vitamin B-12) 1,000 Mcg Tab


1000 MCG PO DAILY for Nutritional Supplement, #1 BOTTLE 0 Refills





Finasteride (Proscar) 5 Mg Tab


5 MG PO DAILY for Manage Prostate Problems, #30 TAB 0 Refills


Do not crush.


Glipizide ER (Glipizide XL) 5 Mg [place]


5 MG PO DAILY for Blood Sugar Management, #30 TAB 0 Refills


Take with breakfast or first main meal of the day


Levothyroxine (Levothyroxine) 175 Mcg Tab


175 MCG PO DAILY for Thyroid, #30 TAB 0 Refills





Lisinopril (Lisinopril) 2.5 Mg Tab


2.5 MG PO DAILY, #30 TAB 0 Refills





Metoprolol Tartrate (Metoprolol Tartrate) 25 Mg Tab


25 MG PO BID, #60 TAB 0 Refills





Terazosin (Terazosin) 2 Mg Cap


2 MG PO HS, #30 CAP 0 Refills





Trazodone (Trazodone) 100 Mg Tablet


100 MG PO HS for Control Depression, #30 TAB 0 Refills





 


Discontinued Medications:  


Hydrocodone-Acetaminophen (Hydrocodone-Acetaminophen) 7.5 Mg-325 Mg Tab


1 TAB PO Q4H PRN for PAIN, TAB 0 Refills

















Liudmila Zuniga MD Jan 31, 2018 11:01